# Patient Record
Sex: FEMALE | Race: WHITE | NOT HISPANIC OR LATINO | Employment: FULL TIME | URBAN - METROPOLITAN AREA
[De-identification: names, ages, dates, MRNs, and addresses within clinical notes are randomized per-mention and may not be internally consistent; named-entity substitution may affect disease eponyms.]

---

## 2024-01-29 DIAGNOSIS — Z12.31 ENCOUNTER FOR SCREENING MAMMOGRAM FOR MALIGNANT NEOPLASM OF BREAST: Primary | ICD-10-CM

## 2024-03-01 ENCOUNTER — HOSPITAL ENCOUNTER (OUTPATIENT)
Dept: RADIOLOGY | Facility: HOSPITAL | Age: 44
Discharge: HOME | End: 2024-03-01
Payer: COMMERCIAL

## 2024-03-01 DIAGNOSIS — Z12.31 ENCOUNTER FOR SCREENING MAMMOGRAM FOR MALIGNANT NEOPLASM OF BREAST: ICD-10-CM

## 2024-03-01 PROCEDURE — 77063 BREAST TOMOSYNTHESIS BI: CPT | Performed by: STUDENT IN AN ORGANIZED HEALTH CARE EDUCATION/TRAINING PROGRAM

## 2024-03-01 PROCEDURE — 77067 SCR MAMMO BI INCL CAD: CPT

## 2024-03-01 PROCEDURE — 77067 SCR MAMMO BI INCL CAD: CPT | Performed by: STUDENT IN AN ORGANIZED HEALTH CARE EDUCATION/TRAINING PROGRAM

## 2025-05-28 ENCOUNTER — TELEPHONE (OUTPATIENT)
Dept: OBSTETRICS AND GYNECOLOGY | Facility: HOSPITAL | Age: 45
End: 2025-05-28
Payer: COMMERCIAL

## 2025-06-24 NOTE — PROGRESS NOTES
Subjective   Patient ID: Emily Medina is a 44 y.o. female who presents for Annual Exam.    PAP 2 years ago per patient she thinks, always normal  MAMM 3-24  DEXA never  COLON never  GARDASIL done    New patient.   x2. .  at [facility]. 15 and 12. Lives in [place].  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  Regular periods. Withdrawal. Vaginal dryness, lubricant irritates.   Takes MVI with D.    H/O left breast lump. Has seen Dr. Quintero. No biopsy, was a cyst. She sees her every year but ok for me to order debi.    Has Terazol for 2-3 yeast infections a year.  HSV diagnosed in . No outbreak since. Got from .    Dad brain cancer, passed. No female cancers.      Review of Systems   Genitourinary:         Vaginal dryness   All other systems reviewed and are negative.      Objective   Constitutional: Alert and in no acute distress. Well developed, well nourished.  Neck: no neck asymmetry. Supple and thyroid not enlarged and there were no palpable thyroid nodules.  Chest: Breasts normal appearance, no nipple discharge and no skin changes and palpation of breasts and axillae: no palpable mass and no axillary lymphadenopathy.  Abdomen: soft nontender; no abdominal mass palpated, no organomegaly and no hernias.  Genitourinary: external genitalia: normal, no inguinal lymphadenopathy, Bartholin's urethral and [place]'s glands: normal, urethra: normal and bladder: normal on palpation.  Vagina: normal.  Cervix: normal.  Uterus: normal.   Right adnexa/parametria: normal.  Left adnexa/parametria: normal.  Skin: normal skin color and pigmentation, normal skin turgor and no rash.  Psychiatric: alert and oriented x 3, affect normal to patient baseline and mood appropriate.     Assessment/Plan   -Pap-HPV  -debi-jaya  -colonoscopy at 45.  -Try food grade oil for lubricant.

## 2025-06-26 ENCOUNTER — APPOINTMENT (OUTPATIENT)
Dept: OBSTETRICS AND GYNECOLOGY | Facility: CLINIC | Age: 45
End: 2025-06-26
Payer: COMMERCIAL

## 2025-06-26 VITALS — WEIGHT: 126 LBS | BODY MASS INDEX: 22.32 KG/M2

## 2025-06-26 DIAGNOSIS — Z01.419 WELL WOMAN EXAM WITH ROUTINE GYNECOLOGICAL EXAM: Primary | ICD-10-CM

## 2025-06-26 DIAGNOSIS — Z12.4 CERVICAL CANCER SCREENING: ICD-10-CM

## 2025-06-26 DIAGNOSIS — Z12.31 ENCOUNTER FOR SCREENING MAMMOGRAM FOR BREAST CANCER: ICD-10-CM

## 2025-06-26 DIAGNOSIS — B37.9 YEAST INFECTION: ICD-10-CM

## 2025-06-26 PROCEDURE — 99386 PREV VISIT NEW AGE 40-64: CPT | Performed by: OBSTETRICS & GYNECOLOGY

## 2025-06-26 PROCEDURE — 88175 CYTOPATH C/V AUTO FLUID REDO: CPT

## 2025-06-26 PROCEDURE — 87626 HPV SEP HI-RSK TYP&POOL RSLT: CPT

## 2025-06-26 PROCEDURE — 1036F TOBACCO NON-USER: CPT | Performed by: OBSTETRICS & GYNECOLOGY

## 2025-06-26 RX ORDER — PRENATAL 168/IRON/FOLIC/OMEGA3 27-800-235
CAPSULE ORAL
COMMUNITY

## 2025-06-26 RX ORDER — TERCONAZOLE 4 MG/G
1 CREAM VAGINAL AS NEEDED
Qty: 45 G | Refills: 0 | Status: SHIPPED | OUTPATIENT
Start: 2025-06-26

## 2025-06-26 RX ORDER — ACYCLOVIR 50 MG/G
1 OINTMENT TOPICAL AS NEEDED
COMMUNITY

## 2025-06-26 RX ORDER — TERCONAZOLE 4 MG/G
45 CREAM VAGINAL AS NEEDED
COMMUNITY
End: 2025-06-26 | Stop reason: WASHOUT

## 2025-06-26 RX ORDER — TERCONAZOLE 4 MG/G
450 CREAM VAGINAL AS NEEDED
Qty: 45 G | Refills: 0 | Status: SHIPPED | OUTPATIENT
Start: 2025-06-26 | End: 2025-06-26

## 2025-07-07 ENCOUNTER — APPOINTMENT (OUTPATIENT)
Dept: RADIOLOGY | Facility: HOSPITAL | Age: 45
End: 2025-07-07
Payer: COMMERCIAL

## 2025-07-07 VITALS — BODY MASS INDEX: 22.32 KG/M2 | HEIGHT: 63 IN | WEIGHT: 126 LBS

## 2025-07-07 DIAGNOSIS — Z12.31 ENCOUNTER FOR SCREENING MAMMOGRAM FOR BREAST CANCER: ICD-10-CM

## 2025-07-07 PROCEDURE — 77067 SCR MAMMO BI INCL CAD: CPT

## 2025-07-07 PROCEDURE — 77067 SCR MAMMO BI INCL CAD: CPT | Performed by: RADIOLOGY

## 2025-07-07 PROCEDURE — 77063 BREAST TOMOSYNTHESIS BI: CPT | Performed by: RADIOLOGY

## 2025-07-10 DIAGNOSIS — R92.8 ABNORMAL MAMMOGRAM: Primary | ICD-10-CM

## 2025-07-14 ENCOUNTER — HOSPITAL ENCOUNTER (OUTPATIENT)
Dept: RADIOLOGY | Facility: HOSPITAL | Age: 45
Discharge: HOME | End: 2025-07-14
Payer: COMMERCIAL

## 2025-07-14 DIAGNOSIS — R92.8 ABNORMAL MAMMOGRAM: ICD-10-CM

## 2025-07-14 PROCEDURE — 77065 DX MAMMO INCL CAD UNI: CPT | Mod: LEFT SIDE | Performed by: RADIOLOGY

## 2025-07-14 PROCEDURE — 76981 USE PARENCHYMA: CPT | Mod: LT

## 2025-07-14 PROCEDURE — 76642 ULTRASOUND BREAST LIMITED: CPT | Mod: LT

## 2025-07-14 PROCEDURE — 77065 DX MAMMO INCL CAD UNI: CPT | Mod: LT

## 2025-07-14 PROCEDURE — 76642 ULTRASOUND BREAST LIMITED: CPT | Mod: LEFT SIDE | Performed by: RADIOLOGY
